# Patient Record
Sex: MALE | Race: WHITE
[De-identification: names, ages, dates, MRNs, and addresses within clinical notes are randomized per-mention and may not be internally consistent; named-entity substitution may affect disease eponyms.]

---

## 2019-10-21 ENCOUNTER — APPOINTMENT (OUTPATIENT)
Dept: OTOLARYNGOLOGY | Facility: CLINIC | Age: 24
End: 2019-10-21
Payer: MEDICAID

## 2019-10-21 VITALS
HEART RATE: 53 BPM | BODY MASS INDEX: 16.48 KG/M2 | DIASTOLIC BLOOD PRESSURE: 54 MMHG | HEIGHT: 67 IN | SYSTOLIC BLOOD PRESSURE: 100 MMHG | WEIGHT: 105 LBS

## 2019-10-21 DIAGNOSIS — H92.03 OTALGIA, BILATERAL: ICD-10-CM

## 2019-10-21 DIAGNOSIS — H93.13 TINNITUS, BILATERAL: ICD-10-CM

## 2019-10-21 DIAGNOSIS — J06.9 ACUTE UPPER RESPIRATORY INFECTION, UNSPECIFIED: ICD-10-CM

## 2019-10-21 PROBLEM — Z00.00 ENCOUNTER FOR PREVENTIVE HEALTH EXAMINATION: Status: ACTIVE | Noted: 2019-10-21

## 2019-10-21 PROCEDURE — 99203 OFFICE O/P NEW LOW 30 MIN: CPT

## 2019-10-21 NOTE — HISTORY OF PRESENT ILLNESS
[de-identified] : co  bilateral ear congestion\par hx cerumen\par recent uri and plane flight\par 8 y au tinnitus after concert

## 2019-10-21 NOTE — ASSESSMENT
[FreeTextEntry1] : cerumen large amount cleared\par symptoms relieved\par longstanding tinnitus had wk up in past declines audio today

## 2020-02-22 ENCOUNTER — EMERGENCY (EMERGENCY)
Facility: HOSPITAL | Age: 25
LOS: 0 days | Discharge: HOME | End: 2020-02-22
Attending: EMERGENCY MEDICINE
Payer: COMMERCIAL

## 2020-02-22 VITALS — HEIGHT: 68 IN | WEIGHT: 119.93 LBS

## 2020-02-22 VITALS
OXYGEN SATURATION: 98 % | DIASTOLIC BLOOD PRESSURE: 87 MMHG | SYSTOLIC BLOOD PRESSURE: 125 MMHG | RESPIRATION RATE: 20 BRPM | HEART RATE: 79 BPM

## 2020-02-22 DIAGNOSIS — R53.81 OTHER MALAISE: ICD-10-CM

## 2020-02-22 DIAGNOSIS — R73.9 HYPERGLYCEMIA, UNSPECIFIED: ICD-10-CM

## 2020-02-22 LAB
ALBUMIN SERPL ELPH-MCNC: 4.8 G/DL — SIGNIFICANT CHANGE UP (ref 3.3–5)
ALP SERPL-CCNC: 78 U/L — SIGNIFICANT CHANGE UP (ref 40–120)
ALT FLD-CCNC: 39 U/L — SIGNIFICANT CHANGE UP (ref 12–78)
ANION GAP SERPL CALC-SCNC: 6 MMOL/L — SIGNIFICANT CHANGE UP (ref 5–17)
AST SERPL-CCNC: 31 U/L — SIGNIFICANT CHANGE UP (ref 15–37)
BASOPHILS # BLD AUTO: 0.04 K/UL — SIGNIFICANT CHANGE UP (ref 0–0.2)
BASOPHILS NFR BLD AUTO: 0.5 % — SIGNIFICANT CHANGE UP (ref 0–2)
BILIRUB SERPL-MCNC: 1.9 MG/DL — HIGH (ref 0.2–1.2)
BUN SERPL-MCNC: 11 MG/DL — SIGNIFICANT CHANGE UP (ref 7–23)
CALCIUM SERPL-MCNC: 9.2 MG/DL — SIGNIFICANT CHANGE UP (ref 8.5–10.1)
CHLORIDE SERPL-SCNC: 106 MMOL/L — SIGNIFICANT CHANGE UP (ref 96–108)
CO2 SERPL-SCNC: 26 MMOL/L — SIGNIFICANT CHANGE UP (ref 22–31)
CREAT SERPL-MCNC: 0.89 MG/DL — SIGNIFICANT CHANGE UP (ref 0.5–1.3)
EOSINOPHIL # BLD AUTO: 0.03 K/UL — SIGNIFICANT CHANGE UP (ref 0–0.5)
EOSINOPHIL NFR BLD AUTO: 0.4 % — SIGNIFICANT CHANGE UP (ref 0–6)
GLUCOSE SERPL-MCNC: 128 MG/DL — HIGH (ref 70–99)
HCT VFR BLD CALC: 48.7 % — SIGNIFICANT CHANGE UP (ref 39–50)
HGB BLD-MCNC: 16.9 G/DL — SIGNIFICANT CHANGE UP (ref 13–17)
IMM GRANULOCYTES NFR BLD AUTO: 0.3 % — SIGNIFICANT CHANGE UP (ref 0–1.5)
LYMPHOCYTES # BLD AUTO: 1.39 K/UL — SIGNIFICANT CHANGE UP (ref 1–3.3)
LYMPHOCYTES # BLD AUTO: 18.5 % — SIGNIFICANT CHANGE UP (ref 13–44)
MCHC RBC-ENTMCNC: 30.4 PG — SIGNIFICANT CHANGE UP (ref 27–34)
MCHC RBC-ENTMCNC: 34.7 GM/DL — SIGNIFICANT CHANGE UP (ref 32–36)
MCV RBC AUTO: 87.6 FL — SIGNIFICANT CHANGE UP (ref 80–100)
MONOCYTES # BLD AUTO: 1.17 K/UL — HIGH (ref 0–0.9)
MONOCYTES NFR BLD AUTO: 15.5 % — HIGH (ref 2–14)
NEUTROPHILS # BLD AUTO: 4.88 K/UL — SIGNIFICANT CHANGE UP (ref 1.8–7.4)
NEUTROPHILS NFR BLD AUTO: 64.8 % — SIGNIFICANT CHANGE UP (ref 43–77)
PLATELET # BLD AUTO: 177 K/UL — SIGNIFICANT CHANGE UP (ref 150–400)
POTASSIUM SERPL-MCNC: 3.9 MMOL/L — SIGNIFICANT CHANGE UP (ref 3.5–5.3)
POTASSIUM SERPL-SCNC: 3.9 MMOL/L — SIGNIFICANT CHANGE UP (ref 3.5–5.3)
PROT SERPL-MCNC: 8 GM/DL — SIGNIFICANT CHANGE UP (ref 6–8.3)
RBC # BLD: 5.56 M/UL — SIGNIFICANT CHANGE UP (ref 4.2–5.8)
RBC # FLD: 11.4 % — SIGNIFICANT CHANGE UP (ref 10.3–14.5)
SODIUM SERPL-SCNC: 138 MMOL/L — SIGNIFICANT CHANGE UP (ref 135–145)
WBC # BLD: 7.53 K/UL — SIGNIFICANT CHANGE UP (ref 3.8–10.5)
WBC # FLD AUTO: 7.53 K/UL — SIGNIFICANT CHANGE UP (ref 3.8–10.5)

## 2020-02-22 PROCEDURE — 99283 EMERGENCY DEPT VISIT LOW MDM: CPT

## 2020-02-22 PROCEDURE — 36415 COLL VENOUS BLD VENIPUNCTURE: CPT

## 2020-02-22 PROCEDURE — 85025 COMPLETE CBC W/AUTO DIFF WBC: CPT

## 2020-02-22 PROCEDURE — 82009 KETONE BODYS QUAL: CPT

## 2020-02-22 PROCEDURE — 80053 COMPREHEN METABOLIC PANEL: CPT

## 2020-02-22 PROCEDURE — 82962 GLUCOSE BLOOD TEST: CPT

## 2020-02-22 RX ORDER — SODIUM CHLORIDE 9 MG/ML
2000 INJECTION INTRAMUSCULAR; INTRAVENOUS; SUBCUTANEOUS ONCE
Refills: 0 | Status: DISCONTINUED | OUTPATIENT
Start: 2020-02-22 | End: 2020-02-22

## 2020-02-22 NOTE — ED PROVIDER NOTE - OBJECTIVE STATEMENT
See progress note
23 y/o male in ED c/o not feeling well x 2 days.   pt states seen in UC yesterday and started on abx for dental abscess.    pt states today did not eat anything and was not feeling well.   states tooth felt better.    states this evening drank bottle of OJ before returning to  for eval.   pt states they took his FS and found to be 350 and told him to go to ED for eval.   pt states now feels better after drinking OJ.   pt denies any fever, HA, cp, sob, n/v/d/abd pain.   tolerating PO

## 2020-02-22 NOTE — ED ADULT NURSE NOTE - OBJECTIVE STATEMENT
Pt presents to er with complaints of possible dka, was previously at urgent care and drank apple juice, states he was carri over the past week and was only drinking soda, pt states all symptoms have gone since his arrival to the ed and only wants lab work at this time, safety maintained, will continue to monitor.

## 2020-02-22 NOTE — ED PROVIDER NOTE - PATIENT PORTAL LINK FT
You can access the FollowMyHealth Patient Portal offered by [facility] by registering at the following website: [email or web address]. By joining [employer]’s FollowMyHealth portal, you will also be able to view your health information using other applications (apps) compatible with our system.

## 2020-02-22 NOTE — ED ADULT TRIAGE NOTE - CHIEF COMPLAINT QUOTE
Sent by outpatient urgent care to r/o DKA. Pt went to urgent care initially because "I felt like crap, lightheaded and feverish". Denies urinary frequency, extreme thirst, chest pain. Currently on PO Amoxicillin for dental abscess.  in triage. Family hx of DM on father's side but denies personal medical history. Ambulatory in triage.

## 2020-02-22 NOTE — ED PROVIDER NOTE - PROGRESS NOTE DETAILS
Pt. is a 23 yo M with a  hx of anxiety sent by urgent care for low grade fever (100.6F) and hyperglycemia (Glc 300s) this evening.  Patient states he had been taking NSAIDs for right forearm/wrist sprain which resolved.  He saw doctor at urgent care center yesterday for right sided upper dental pain, was diagnosed with dental abscess and placed on amoxicillin.  Patient states today he awoke without any appetite and felt ill all day.  He had 1 episode of bilious vomiting.  Denies fever, chills, sweats, runny nose, sore throat, cough.  Dental pain is currently 1/10 and area of dental swelling patient states resolved in 24 hours while on amoxicillin. Patient eating less, but drank water today.  Patient had apple juice and roll in friends car this evening and then returned to urgent care for repeat visit due to feeling ill.  Patient sent to ED to r/o new onset diabetes/DKA.  On arrival in ED fingerstick glucose was 140s.  Patient states he is nervous but is already feeling better.  Family hx of diabetes (dad).  Patient sent to the Main ED for evaluation.

## 2020-02-22 NOTE — ED PROVIDER NOTE - CLINICAL SUMMARY MEDICAL DECISION MAKING FREE TEXT BOX
pt with h/o dental abscess as per pt currently on abx for same.   in ED with normal VS and FS.   pt requesting labs.   will check labs and have f/u

## 2020-02-22 NOTE — ED ADULT NURSE NOTE - NSIMPLEMENTINTERV_GEN_ALL_ED
Implemented All Universal Safety Interventions:  [place] to call system. Call bell, personal items and telephone within reach. Instruct patient to call for assistance. Room bathroom lighting operational. Non-slip footwear when patient is off stretcher. Physically safe environment: no spills, clutter or unnecessary equipment. Stretcher in lowest position, wheels locked, appropriate side rails in place.

## 2020-02-22 NOTE — ED PROVIDER NOTE - NSFOLLOWUPINSTRUCTIONS_ED_ALL_ED_FT
please follow up with your doctor in 2-3 days.   continue with antibiotic as prescribed.   take motrin and tylenol for pain and fever.   drink plenty of fluids.   return to ED for any concerns

## 2020-12-21 PROBLEM — J06.9 ACUTE URI: Status: RESOLVED | Noted: 2019-10-21 | Resolved: 2020-12-21

## 2021-01-24 ENCOUNTER — EMERGENCY (EMERGENCY)
Facility: HOSPITAL | Age: 26
LOS: 0 days | Discharge: HOME | End: 2021-01-24
Attending: EMERGENCY MEDICINE
Payer: COMMERCIAL

## 2021-01-24 VITALS
OXYGEN SATURATION: 99 % | HEART RATE: 90 BPM | RESPIRATION RATE: 18 BRPM | TEMPERATURE: 98 F | DIASTOLIC BLOOD PRESSURE: 80 MMHG | SYSTOLIC BLOOD PRESSURE: 142 MMHG

## 2021-01-24 VITALS — HEIGHT: 68 IN | WEIGHT: 149.91 LBS

## 2021-01-24 DIAGNOSIS — I86.1 SCROTAL VARICES: ICD-10-CM

## 2021-01-24 DIAGNOSIS — N50.812 LEFT TESTICULAR PAIN: ICD-10-CM

## 2021-01-24 PROBLEM — Z78.9 OTHER SPECIFIED HEALTH STATUS: Chronic | Status: ACTIVE | Noted: 2020-02-22

## 2021-01-24 LAB
APPEARANCE UR: CLEAR — SIGNIFICANT CHANGE UP
BILIRUB UR-MCNC: NEGATIVE — SIGNIFICANT CHANGE UP
COLOR SPEC: YELLOW — SIGNIFICANT CHANGE UP
DIFF PNL FLD: NEGATIVE — SIGNIFICANT CHANGE UP
GLUCOSE UR QL: NEGATIVE MG/DL — SIGNIFICANT CHANGE UP
KETONES UR-MCNC: ABNORMAL
LEUKOCYTE ESTERASE UR-ACNC: NEGATIVE — SIGNIFICANT CHANGE UP
NITRITE UR-MCNC: NEGATIVE — SIGNIFICANT CHANGE UP
PH UR: 8 — SIGNIFICANT CHANGE UP (ref 5–8)
PROT UR-MCNC: NEGATIVE MG/DL — SIGNIFICANT CHANGE UP
SP GR SPEC: 1.01 — SIGNIFICANT CHANGE UP (ref 1.01–1.02)
UROBILINOGEN FLD QL: NEGATIVE MG/DL — SIGNIFICANT CHANGE UP

## 2021-01-24 PROCEDURE — 76870 US EXAM SCROTUM: CPT | Mod: 26

## 2021-01-24 PROCEDURE — 87591 N.GONORRHOEAE DNA AMP PROB: CPT

## 2021-01-24 PROCEDURE — 81003 URINALYSIS AUTO W/O SCOPE: CPT

## 2021-01-24 PROCEDURE — 76870 US EXAM SCROTUM: CPT

## 2021-01-24 PROCEDURE — 87086 URINE CULTURE/COLONY COUNT: CPT

## 2021-01-24 PROCEDURE — 99284 EMERGENCY DEPT VISIT MOD MDM: CPT | Mod: 25

## 2021-01-24 PROCEDURE — 87491 CHLMYD TRACH DNA AMP PROBE: CPT

## 2021-01-24 PROCEDURE — 99284 EMERGENCY DEPT VISIT MOD MDM: CPT

## 2021-01-24 RX ORDER — ACETAMINOPHEN 500 MG
650 TABLET ORAL ONCE
Refills: 0 | Status: COMPLETED | OUTPATIENT
Start: 2021-01-24 | End: 2021-01-24

## 2021-01-24 NOTE — ED ADULT TRIAGE NOTE - CHIEF COMPLAINT QUOTE
pt a/ox3, reports left sided groin pain since last night. pt reports seeing virtual urgent care, and was instructed to come to ED for ultrasound. pt denies meds PTA. pt denies all other complaints

## 2021-01-24 NOTE — ED STATDOCS - MDM ORDERS SUBMITTED SELECTION
Patient needs a 30 day supply of his carvedilol (COREG) 3.125 MG tablet and atorvastatin (LIPITOR) 80 MG tablet sent [facility] in [facility].    Imaging Studies/Not Applicable

## 2021-01-24 NOTE — ED STATDOCS - GENITOURINARY, MLM
normal... no bladder tenderness, no bilateral CVA tenderness. normal testicular bilateral cremasteric reflex

## 2021-01-24 NOTE — ED STATDOCS - NSFOLLOWUPINSTRUCTIONS_ED_ALL_ED_FT
Varicocele       A varicocele is a swelling of veins in the scrotum. The scrotum is the sac that contains the testicles. Varicoceles can occur on either side of the scrotum, but they are more common on the left side. They occur most often in teenage boys and young men.  In most cases, varicoceles are not a serious problem. They are usually small and painless and do not require treatment. Tests may be done to confirm the diagnosis. Treatment may be needed if:  •A varicocele is large, causes a lot of pain, or causes pain when exercising.      •Varicoceles are found on both sides of the scrotum.      •A varicocele causes a decrease in the size of the testicle in a growing adolescent.      •The person has fertility problems.        What are the causes?    This condition is the result of valves in the veins not working properly. Valves in the veins help to return blood from the scrotum and testicles to the heart. If these valves do not work well, blood flows backward and backs up into the veins, which causes the veins to swell. This is similar to what happens when varicose veins form in the leg.      What are the signs or symptoms?  Most varicoceles do not cause any symptoms. If symptoms do occur, they may include:  •Swelling on one side of the scrotum. The swelling may be more obvious when you are standing up.      •A lumpy feeling in the scrotum.      •A heavy feeling on one side of the scrotum.      •A dull ache in the scrotum, especially after exercise or prolonged standing or sitting.      •Slower growth or reduced size of the testicle on the side of the varicocele (in young males).      •Problems with fathering a child (fertility). This can occur if the testicle does not grow normally or if the condition causes problems with the sperm, such as a low sperm count or sperm that are not able to reach the egg (poor motility).        How is this diagnosed?  This condition is diagnosed based on:  •Your medical history.      •A physical exam. Your health care provider may inspect and feel (palpate) the scrotal area to check for swollen or enlarged veins.      •An ultrasound. This may be done to confirm the diagnosis and to help rule out other causes of the swelling.        How is this treated?    Treatment is usually not needed for this condition. If you have any pain, your health care provider may prescribe or recommend medicine to help relieve it. You may need regular exams so your health care provider can monitor the varicocele to ensure that it does not cause problems.  When further treatment is needed, it may involve one of these options:  •Varicocelectomy. This is a surgery in which the swollen veins are tied off so that the flow of blood goes to other veins instead.      •Embolization. In this procedure, a small, thin tube (catheter) is used to place metal coils or other blocking items in the veins. This cuts off the blood flow to the swollen veins.        Follow these instructions at home:    •Take over-the-counter and prescription medicines only as told by your health care provider.      •Wear supportive underwear.      •Use an athletic supporter when participating in sports activities.      •Keep all follow-up visits as told by your health care provider. This is important.        Contact a health care provider if:    •Your pain is increasing.      •You have redness in the affected area.      •Your testicle becomes enlarged, swollen, or painful.      •You have swelling that does not decrease when you are lying down.      •One of your testicles is smaller than the other.        Get help right away if:    •You develop swelling in your legs.      •You have difficulty breathing.        Summary    •Varicocele is a condition in which the veins in the scrotum are swollen or enlarged.      •In most cases, varicoceles do not require treatment.      •Treatment may be needed if you have pain, have problems with infertility, or have a smaller testicle associated with the varicocele.      •In some cases, the condition may be treated with a procedure to cut off the flow of blood to the swollen veins.      This information is not intended to replace advice given to you by your health care provider. Make sure you discuss any questions you have with your health care provider.

## 2021-01-24 NOTE — ED STATDOCS - OBJECTIVE STATEMENT
26 y/o male with no relevant PMHx presents to the ED c/o left testicular pain x yesterday. Pt states that he had a telehealth appointment with his doctor yesterday, was recommended to come in to the ED for an ultrasound. Pain has improved since yesterday. Denies any trauma to the area. Denies N/V. Pt states that he had clamydia last year. No new sexual partners, pt states he has not had sex in months.

## 2021-01-24 NOTE — ED STATDOCS - PATIENT PORTAL LINK FT
You can access the FollowMyHealth Patient Portal offered by [facility] by registering at the following website: [email or web address]. By joining [employer]’s FollowMyHealth portal, you will also be able to view your health information using other applications (apps) compatible with our system.

## 2021-01-24 NOTE — ED STATDOCS - CARE PROVIDER_API CALL
Maria Victoria Parsons)  Urology Surgery  [address] [place] [address]  Phone: [phone]  Fax: [phone]  Follow Up Time:

## 2021-01-24 NOTE — ED STATDOCS - PROGRESS NOTE DETAILS
24 yo male presents with L testicular pain since yesterday which has been improving today. Last sexual activity was a few months ago, nothing recent, Denies abd pain, fever, n/v/d, trauma. ELDA Montiel, Reeval. -Dieudonne Palmer PA-C Sono showing L varicocele. Discussed with pt and UA unremarkable. Advised to take nsaids and f/u with urology. Pt aware and agrees with plan. -Dieudonne Palmer PA-C

## 2021-01-25 LAB
C TRACH RRNA SPEC QL NAA+PROBE: SIGNIFICANT CHANGE UP
CULTURE RESULTS: SIGNIFICANT CHANGE UP
N GONORRHOEA RRNA SPEC QL NAA+PROBE: SIGNIFICANT CHANGE UP
SPECIMEN SOURCE: SIGNIFICANT CHANGE UP

## 2021-02-19 ENCOUNTER — APPOINTMENT (OUTPATIENT)
Dept: UROLOGY | Facility: CLINIC | Age: 26
End: 2021-02-19

## 2021-11-22 ENCOUNTER — APPOINTMENT (OUTPATIENT)
Dept: OTOLARYNGOLOGY | Facility: CLINIC | Age: 26
End: 2021-11-22
Payer: COMMERCIAL

## 2021-11-22 VITALS
SYSTOLIC BLOOD PRESSURE: 100 MMHG | DIASTOLIC BLOOD PRESSURE: 55 MMHG | BODY MASS INDEX: 16.48 KG/M2 | HEART RATE: 52 BPM | WEIGHT: 105 LBS | TEMPERATURE: 98.6 F | HEIGHT: 67 IN

## 2021-11-22 DIAGNOSIS — H93.8X3 OTHER SPECIFIED DISORDERS OF EAR, BILATERAL: ICD-10-CM

## 2021-11-22 DIAGNOSIS — H60.92 UNSPECIFIED OTITIS EXTERNA, LEFT EAR: ICD-10-CM

## 2021-11-22 DIAGNOSIS — H61.23 IMPACTED CERUMEN, BILATERAL: ICD-10-CM

## 2021-11-22 PROCEDURE — 99213 OFFICE O/P EST LOW 20 MIN: CPT

## 2021-11-22 RX ORDER — OFLOXACIN OTIC 3 MG/ML
0.3 SOLUTION AURICULAR (OTIC) TWICE DAILY
Qty: 1 | Refills: 0 | Status: ACTIVE | COMMUNITY
Start: 2021-11-22 | End: 1900-01-01

## 2021-11-22 RX ORDER — DEXTROAMPHETAMINE SACCHARATE, AMPHETAMINE ASPARTATE, DEXTROAMPHETAMINE SULFATE AND AMPHETAMINE SULFATE 5; 5; 5; 5 MG/1; MG/1; MG/1; MG/1
20 TABLET ORAL
Qty: 30 | Refills: 0 | Status: ACTIVE | COMMUNITY
Start: 2021-11-16

## 2021-11-22 NOTE — PHYSICAL EXAM
[Midline] : trachea located in midline position [Normal] : no rashes [de-identified] : bilat impacted cerumen ; swollen left eac [de-identified] : after cerumen removal

## 2021-11-22 NOTE — HISTORY OF PRESENT ILLNESS
[de-identified] : c/o clogged left ear and some ear discomfort.  Problem started 3-4 days ago.   Self treated with irrigation without relief.

## 2021-11-22 NOTE — ASSESSMENT
[FreeTextEntry1] : Patient c/o clogged ears - worse on left with some left ear pain.   Had attempted to irrigate his own ears unsuccessfully.  Bilateral cerumen removed .  Right ear normal after but patient does have left swollen eac - recommended dry ear precautions for OE of left ear and ofloxin.  Follow up in 2 weeks and as necessary

## 2021-12-14 ENCOUNTER — APPOINTMENT (OUTPATIENT)
Dept: OTOLARYNGOLOGY | Facility: CLINIC | Age: 26
End: 2021-12-14

## 2023-07-20 NOTE — ED ADULT NURSE NOTE - ISOLATION TYPE:
Activity as tolerated/Pivot to wheelchair/bed etc        Coccyx and L buttock:  Irrigate with dakins, pack wounds with saline moist kerlix, ABD pad cover, tape   None

## 2024-07-23 ENCOUNTER — EMERGENCY (EMERGENCY)
Facility: HOSPITAL | Age: 29
LOS: 0 days | Discharge: LEFT BEFORE DISCHARGE | End: 2024-07-24
Attending: EMERGENCY MEDICINE
Payer: COMMERCIAL

## 2024-07-23 DIAGNOSIS — M54.2 CERVICALGIA: ICD-10-CM
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
## 2024-07-23 DIAGNOSIS — Z53.21 PROCEDURE AND TREATMENT NOT CARRIED OUT DUE TO PATIENT LEAVING PRIOR TO BEING SEEN BY HEALTH CARE PROVIDER: ICD-10-CM
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
## 2024-07-23 PROCEDURE — L9991: CPT

## 2024-07-24 VITALS
WEIGHT: 112.22 LBS | TEMPERATURE: 98 F | RESPIRATION RATE: 18 BRPM | DIASTOLIC BLOOD PRESSURE: 72 MMHG | HEIGHT: 67 IN | OXYGEN SATURATION: 100 % | SYSTOLIC BLOOD PRESSURE: 109 MMHG | HEART RATE: 67 BPM
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
## 2025-05-12 ENCOUNTER — EMERGENCY (EMERGENCY)
Facility: HOSPITAL | Age: 30
LOS: 0 days | Discharge: HOME | End: 2025-05-12
Attending: EMERGENCY MEDICINE
Payer: COMMERCIAL

## 2025-05-12 VITALS
RESPIRATION RATE: 18 BRPM | TEMPERATURE: 98 F | HEART RATE: 77 BPM | OXYGEN SATURATION: 100 % | DIASTOLIC BLOOD PRESSURE: 78 MMHG | SYSTOLIC BLOOD PRESSURE: 107 MMHG

## 2025-05-12 VITALS — WEIGHT: 109.79 LBS

## 2025-05-12 DIAGNOSIS — M54.2 CERVICALGIA: ICD-10-CM

## 2025-05-12 PROCEDURE — 87476 LYME DIS DNA AMP PROBE: CPT

## 2025-05-12 PROCEDURE — 99283 EMERGENCY DEPT VISIT LOW MDM: CPT | Mod: 25

## 2025-05-12 PROCEDURE — 87468 ANAPLSMA PHGCYTOPHLM AMP PRB: CPT

## 2025-05-12 PROCEDURE — 99284 EMERGENCY DEPT VISIT MOD MDM: CPT

## 2025-05-12 PROCEDURE — 36415 COLL VENOUS BLD VENIPUNCTURE: CPT

## 2025-05-12 PROCEDURE — 36000 PLACE NEEDLE IN VEIN: CPT

## 2025-05-12 PROCEDURE — 86618 LYME DISEASE ANTIBODY: CPT

## 2025-05-12 PROCEDURE — 87798 DETECT AGENT NOS DNA AMP: CPT

## 2025-05-12 PROCEDURE — 87484 EHRLICHA CHAFFEENSIS AMP PRB: CPT

## 2025-05-12 RX ORDER — CYCLOBENZAPRINE HYDROCHLORIDE 15 MG/1
1 CAPSULE, EXTENDED RELEASE ORAL
Qty: 20 | Refills: 0
Start: 2025-05-12

## 2025-05-12 RX ORDER — KETOROLAC TROMETHAMINE 30 MG/ML
15 INJECTION, SOLUTION INTRAMUSCULAR; INTRAVENOUS ONCE
Refills: 0 | Status: DISCONTINUED | OUTPATIENT
Start: 2025-05-12 | End: 2025-05-12

## 2025-05-12 NOTE — ED STATDOCS - PATIENT PORTAL LINK FT
You can access the FollowMyHealth Patient Portal offered by [facility] by registering at the following website: [email or web address]. By joining [employer]’s FollowMyHealth portal, you will also be able to view your health information using other applications (apps) compatible with our system.

## 2025-05-12 NOTE — ED ADULT TRIAGE NOTE - CHIEF COMPLAINT QUOTE
Pt comes to the ED complaining of stiff neck worsening over the past two days. Pt denies any fevers. Pt states that he is unsure if he got bit by a tick or not 2 days ago.

## 2025-05-12 NOTE — ED STATDOCS - NSFOLLOWUPINSTRUCTIONS_ED_ALL_ED_FT
Tick Bite Information, Adult  A close-up of a tick biting a person's shoulder.  Ticks are insects that draw blood for food. They climb onto people and animals that brush against the leaves and grasses that they live in. They then bite and attach to the skin.    Most ticks are harmless, but some ticks may carry germs that can cause disease. These germs are spread to a person through a bite. To lower your risk of getting a disease from a tick bite, make sure you:  Take steps to prevent tick bites.  Check for ticks after being outdoors where ticks live.  Watch for symptoms of disease if a tick attached to you or if you think a tick bit you.  How can I prevent tick bites?  Take these steps to help prevent tick bites when you go outdoors in an area where ticks live:    Before you go outdoors:    Wear long sleeves and long pants to protect your skin from ticks.  Wear light-colored clothing so you can see ticks easier.  Tuck your pant legs into your socks.  Apply insect repellent that has DEET (20% or higher), picaridin, or NO5611 in it to the following areas:  Any bare skin. Avoid areas around the eyes and mouth.  Edges of clothing, like the top of your boots, the bottom of your pant legs, and your sleeve cuffs.  Consider applying an insect repellant that contains permethrin. Follow the instructions on the label. Do not apply permethrin directly to the skin. Instead, apply to the following areas:  Clothing and shoes.  Outdoor gear and tents.  When you are outdoors:    Avoid walking through areas with long grass.  If you are walking on a trail, stay in the middle of the trail so your skin, hair, and clothing do not touch the bushes.  Check for ticks on your clothing, hair, and skin often while you are outdoors. Check again before you go inside.  When you go indoors:    Check your clothing for ticks. Tumble dry clothes in a dryer on high heat for at least 10 minutes. If clothes are damp, additional time may be needed. If clothes require washing, use hot water.  Check your gear and pets.  Shower soon after being outdoors.  Check your body for ticks. Do a full body check using a mirror. Be sure to check your scalp, neck, armpits, waist, groin, and joint areas. These are the spots where ticks attach themselves most often.  What is the best way to remove a tick?  How to use tweezers to safely remove a tick.  Remove the tick as soon as possible. Removing it can prevent germs from passing to your body.  Do not remove the tick with your bare fingers.  Do not try to remove a tick with heat, alcohol, petroleum jelly, or fingernail polish. These things can cause the tick to salivate and regurgitate into your bloodstream, increasing your risk of getting a disease.  To remove a tick that is crawling on your skin:  Go outside and brush the tick off.  Use tape or a lint roller.  To remove a tick that is attached to your skin:  Wash your hands. If you have gloves, put them on.  Use a fine-tipped tweezer, curved forceps, or a tick-removal tool to gently grasp the tick as close to your skin and the tick's head as possible.  Gently pull with a steady, upward, and even pressure until the tick lets go.  While removing the tick:  Take care to keep the tick's head attached to its body.  Do not twist or jerk the tick. This can make the tick's head or mouth parts break off and stay in your skin. If this happens, try to remove the mouth parts with tweezers. If you cannot remove them, leave the area alone and let the skin heal.  Do not squeeze or crush the tick's body. This could force disease-carrying fluids from the tick into your body.  What should I do after removing a tick?  Clean the bite area and your hands with soap and water, rubbing alcohol, or an iodine scrub.  If an antiseptic cream or ointment is available, put a small amount on the bite area.  Wash and disinfect any tools that you used to remove the tick.  How should I dispose of a tick?  To dispose of a live tick, use one of these methods:  Place it in rubbing alcohol.  Place it in a sealed bag or container, and throw it away.  Wrap it tightly in tape, and throw it away.  Flush it down the toilet.  Where to find more information  Centers for Disease Control and Prevention: cdc.gov/ticks  U.S. Environmental Protection Agency: epa.gov/insect-repellents  Contact a health care provider if:  You have symptoms of a disease after a tick bite. Symptoms of a tick-borne disease can occur from moments after the tick bites to 30 days after a tick is removed. Symptoms include:  Fever or chills.  A red rash that makes a [place] (bull's-eye rash) in the bite area.  Redness and swelling in the bite area.  Headache or stiff neck.  Muscle, joint, or bone pain.  Abnormal tiredness.  Numbness in your legs or trouble walking or moving your legs.  Tender or swollen lymph glands.  Abdominal pain, vomiting, diarrhea, or weight loss.  Get help right away if:  You are not able to remove a tick.  You have muscle weakness or paralysis.  Your symptoms get worse or you experience new symptoms.  You find an engorged tick on your skin and you are in an area where there is a higher risk of disease from ticks.  Summary  Ticks may carry germs that can spread to a person through a bite. These germs can cause disease.  Wear protective clothing and use insect repellent to prevent tick bites. Follow the instructions on the label.  If you find a tick on your body, remove it as soon as possible. If the tick is attached, do not try to remove it with heat, alcohol, petroleum jelly, or fingernail polish.  If you have symptoms of a disease after being bitten by a tick, contact a health care provider.  This information is not intended to replace advice given to you by your health care provider. Make sure you discuss any questions you have with your health care provider.

## 2025-05-12 NOTE — ED STATDOCS - OBJECTIVE STATEMENT
30 y/o male with no pertinent pmhx presents to the ED with neck pain after finding a deer tick on his arm. Claims it happened a few days ago. Two days ago left side of neck was sore, which has expanded to both sides of neck today. Denies fever, rash, trauma to neck. Patient doesn't typically experience fits of neck pain. Pt has used Tylenol which hasn't relieved symptoms.

## 2025-05-12 NOTE — ED STATDOCS - CLINICAL SUMMARY MEDICAL DECISION MAKING FREE TEXT BOX
No signs of tick bite.  No signs of meningitis.  Likely muscular tenderness as cause of his pain.  Tickborne labs were drawn, although feel like these are likely to be negative as Synkayvite was less than 24 hours, and not engorged.  Patient was given supportive care with Toradol, discharged home with muscle relaxers.  Will follow-up on lab test.  Strict return precautions given for any worsening.  Patient verbalized understanding and agreed to plan.

## 2025-05-12 NOTE — ED STATDOCS - MUSCULOSKELETAL, MLM
range of motion is not limited. mild, bilateral trapezius, normal range of motion of neck, no skin changes or rashes on neck.

## 2025-05-12 NOTE — ED STATDOCS - PROGRESS NOTE DETAILS
30 y/o M with no PMH presents with bilateral neck pain since yesterday. States he was out hiking, found a deer tick on his arm (pt cannot recall side), which was not imbedded and brushed it off. Checked himself for ticks later that day and did not see any. Reports the next day he woke up with left sided neck pain and now has neck pain on both sides. Denies fever, chills, numbness/tingling in extremities. No change in pain with tylenol at home. PE: Well appearing. Cardiac: s1s2, RRR. Lungs: CTAB. Abdomen: NBS x4 soft, nontender. SKin: Pt with extensive tattoos on arms and neck. No obvious rash on extremities, chest, back, neck. HEENT: NO midline C-spine tenderness. No paraspinal C-spine tenderness. Full C-spine ROM. A/P: likely MSK pain, will send tick borne illness labs, provide toradol and dc. - Jay Humphreys PA-C

## 2025-05-12 NOTE — ED ADULT NURSE NOTE - OBJECTIVE STATEMENT
28 y/o male with no pertinent pmhx presents to the ED with neck pain after finding a deer tick on his arm. Claims it happened a few days ago. Two days ago left side of neck was sore, which has expanded to both sides of neck today. Denies fever, rash, trauma to neck. Patient doesn't typically experience fits of neck pain. Pt has used Tylenol which hasn't relieved symptoms.

## 2025-05-13 LAB
B BURGDOR C6 AB SER-ACNC: NEGATIVE — SIGNIFICANT CHANGE UP
B BURGDOR IGG+IGM SER-ACNC: 0.14 INDEX — SIGNIFICANT CHANGE UP (ref 0.01–0.9)
B MICROTI DNA BLD QL NAA+PROBE: SIGNIFICANT CHANGE UP
BABESIA 18S RRNA BLD QL NAA+PROBE: SIGNIFICANT CHANGE UP

## 2025-05-15 LAB
A PHAGOCYTOPH DNA BLD QL NAA+PROBE: NEGATIVE — SIGNIFICANT CHANGE UP
B BURGDOR DNA SPEC QL NAA+PROBE: NEGATIVE — SIGNIFICANT CHANGE UP
E CHAFFEENSIS DNA BLD QL NAA+PROBE: NEGATIVE — SIGNIFICANT CHANGE UP
E EWINGII DNA SPEC QL NAA+PROBE: NEGATIVE — SIGNIFICANT CHANGE UP
EHRLICHIA DNA SPEC QL NAA+PROBE: NEGATIVE — SIGNIFICANT CHANGE UP